# Patient Record
Sex: FEMALE | Race: ASIAN | NOT HISPANIC OR LATINO | URBAN - METROPOLITAN AREA
[De-identification: names, ages, dates, MRNs, and addresses within clinical notes are randomized per-mention and may not be internally consistent; named-entity substitution may affect disease eponyms.]

---

## 2017-12-21 ENCOUNTER — EMERGENCY (EMERGENCY)
Facility: HOSPITAL | Age: 22
LOS: 1 days | Discharge: HOME | End: 2017-12-21
Attending: EMERGENCY MEDICINE | Admitting: EMERGENCY MEDICINE
Payer: MEDICAID

## 2017-12-21 VITALS
SYSTOLIC BLOOD PRESSURE: 133 MMHG | TEMPERATURE: 98 F | RESPIRATION RATE: 22 BRPM | OXYGEN SATURATION: 100 % | DIASTOLIC BLOOD PRESSURE: 97 MMHG | HEART RATE: 94 BPM

## 2017-12-21 PROCEDURE — 99284 EMERGENCY DEPT VISIT MOD MDM: CPT

## 2017-12-21 PROCEDURE — 71020: CPT | Mod: 26

## 2017-12-21 RX ORDER — IPRATROPIUM/ALBUTEROL SULFATE 18-103MCG
3 AEROSOL WITH ADAPTER (GRAM) INHALATION ONCE
Qty: 0 | Refills: 0 | Status: COMPLETED | OUTPATIENT
Start: 2017-12-21 | End: 2017-12-21

## 2017-12-21 RX ORDER — ALBUTEROL 90 UG/1
2 AEROSOL, METERED ORAL EVERY 6 HOURS
Qty: 0 | Refills: 0 | Status: DISCONTINUED | OUTPATIENT
Start: 2017-12-21 | End: 2017-12-25

## 2017-12-21 RX ADMIN — Medication 3 MILLILITER(S): at 14:21

## 2017-12-21 RX ADMIN — Medication 3 MILLILITER(S): at 14:20

## 2017-12-21 RX ADMIN — Medication 40 MILLIGRAM(S): at 14:20

## 2017-12-21 RX ADMIN — ALBUTEROL 2 PUFF(S): 90 AEROSOL, METERED ORAL at 14:56

## 2017-12-21 NOTE — ED ADULT TRIAGE NOTE - CHIEF COMPLAINT QUOTE
has had episodes of wheezing last several days has been using inhaler more frequently  had 1 episode of hemoptysis given mask in triage

## 2017-12-21 NOTE — ED PROVIDER NOTE - OBJECTIVE STATEMENT
22 yr old female with no sig PMH last year was diagnosed with RAD states induced by cold.  Over last 2-3 days has been audibly wheezing in setting of URI.  Used brothers inhaler with improvement each time.  Does not have her own inhaler.  Denies fever, shortness of breath.

## 2017-12-21 NOTE — ED ADULT NURSE NOTE - OBJECTIVE STATEMENT
22 year old female presents to the Er with asthma exacerbation pt states she has been using her rescue inhaler without relief   pt is alert and oriented speaking full sentences no respiratory distress noted

## 2020-07-23 ENCOUNTER — EMERGENCY (EMERGENCY)
Facility: HOSPITAL | Age: 25
LOS: 0 days | Discharge: HOME | End: 2020-07-23
Payer: COMMERCIAL

## 2020-07-23 VITALS
HEART RATE: 115 BPM | DIASTOLIC BLOOD PRESSURE: 76 MMHG | HEIGHT: 65 IN | OXYGEN SATURATION: 100 % | SYSTOLIC BLOOD PRESSURE: 152 MMHG | RESPIRATION RATE: 16 BRPM | TEMPERATURE: 99 F | WEIGHT: 145.06 LBS

## 2020-07-23 VITALS — HEART RATE: 95 BPM

## 2020-07-23 DIAGNOSIS — R58 HEMORRHAGE, NOT ELSEWHERE CLASSIFIED: ICD-10-CM

## 2020-07-23 DIAGNOSIS — M79.602 PAIN IN LEFT ARM: ICD-10-CM

## 2020-07-23 PROCEDURE — 99283 EMERGENCY DEPT VISIT LOW MDM: CPT

## 2020-07-23 RX ORDER — IBUPROFEN 200 MG
600 TABLET ORAL ONCE
Refills: 0 | Status: COMPLETED | OUTPATIENT
Start: 2020-07-23 | End: 2020-07-23

## 2020-07-23 RX ADMIN — Medication 600 MILLIGRAM(S): at 21:48

## 2020-07-23 NOTE — ED PROVIDER NOTE - MUSCULOSKELETAL, MLM
Spine appears normal, range of motion is not limited, no muscle or joint tenderness. radial and brachial pulses intact + mild TTP to left antecubtal region at site of ecchymosis. no bony tenderness. FROM of elbow. no firmness of vein at AC. radial pulses equal and intact bilaterally.

## 2020-07-23 NOTE — ED ADULT TRIAGE NOTE - CHIEF COMPLAINT QUOTE
Pt complains of pain and stiffness to left arm. States she had blood work done on 07/21 and has bruising to arm 5/10 achy

## 2020-07-23 NOTE — ED PROVIDER NOTE - NEUROLOGICAL, MLM
Alert and oriented, no focal deficits, no motor or sensory deficits of UE on exam. Strength of bilateral UE 5/5.

## 2020-07-23 NOTE — ED PROVIDER NOTE - NSFOLLOWUPINSTRUCTIONS_ED_ALL_ED_FT
Superficial Thrombophlebitis    WHAT YOU NEED TO KNOW:    What is superficial thrombophlebitis (STP)? STP is inflammation of a vein just under your skin (superficial vein). The inflammation causes a blood clot to form in your vein. STP most often happens in your leg but may also happen in your arm.    What increases my risk for STP?     A condition that affects your blood vessels, such as varicose veins      A long-term IV catheter      Recent surgery      Multiple IV injections or IV drug abuse      Obesity, pregnancy, or cancer      Limited activity caused by bed rest, a leg cast, or sitting for long periods      A blood disorder that makes your blood clot faster than normal, such as factor V Leiden mutation      In women, hormone replacement therapy or birth control pills    What are the signs and symptoms of STP? You may see a red line on your skin that covers the vein. You may also have swelling and pain near the vein. You may have a fever if infection has spread from your vein to others places in your body.    How is STP diagnosed? Your healthcare provider will examine you. You may need any of the following:     Blood tests may be done to check for infection and test how fast your blood clots.       Doppler ultrasound uses sound waves to check for blood clots or damage to your vein.     How is STP treated?     Medicines may be given to treat an infection and decrease swelling and pain. Medicine may also be given to prevent more blood clots.      Removal of an IV catheter may be needed if your IV is infected.      Surgery may be needed to remove the blood clot or part of your vein. Surgery may also be needed to remove a collection of infected fluid from your vein.    What can I do to manage STP?     Apply a warm compress to your arm or leg. This will help decrease swelling and pain. Wet a washcloth in warm water. Do not use hot water. Apply the warm compress for 10 minutes. Repeat this 4 times each day.       Wear pressure stockings as directed. Pressure stockings improve blood flow and help prevent clots in your legs. Wear the stockings during the day. Do not wear them when you sleep. Pressure Stockings            Elevate your leg or arm above the level of your heart as often as you can. This will help decrease swelling and pain. Prop your leg or arm on pillows or blankets to keep it elevated comfortably. Elevate Limb         What can I do to prevent STP?     Maintain a healthy weight. This will help decrease your risk for another blood clot. Ask your healthcare provider how much you should weigh. Ask him or her to help you create a weight loss plan if you are overweight.      Do not smoke. Nicotine and other chemicals in cigarettes and cigars can damage blood vessels and increase your risk for blood clots. Ask your healthcare provider for information if you currently smoke and need help to quit. E-cigarettes or smokeless tobacco still contain nicotine. Talk to your healthcare provider before you use these products.      Change your body position or move around often. Move and stretch in your seat several times each hour if you travel by car or work at a desk. In an airplane, get up and walk every hour. Move your legs by tightening and releasing your leg muscles while sitting. You can move your legs while sitting by raising and lowering your heels. Keep your toes on the floor while you do this. You can also raise and lower your toes while keeping your heels on the floor.DVT Prevention Heel Raise     DVT Prevention Toe Raise           Exercise regularly to help increase your blood flow. Walking is a good low-impact exercise. Talk to your healthcare provider about the best exercise plan for you. Walking for Exercise           Do not inject illegal drugs. Talk to your healthcare provider if you use IV drugs and need help to quit.    Call your local emergency number (911 in the [place]) if:     You feel lightheaded, short of breath, and have chest pain.      You cough up blood.    When should I call my doctor?     Your arm or leg feels warm, tender, and painful. It may look swollen and red.      You have questions or concerns about your condition or care.    CARE AGREEMENT:    You have the right to help plan your care. Learn about your health condition and how it may be treated. Discuss treatment options with your healthcare providers to decide what care you want to receive. You always have the right to refuse treatment.

## 2020-07-23 NOTE — ED PROVIDER NOTE - OBJECTIVE STATEMENT
26 yo female with no significant pmh presents to the ED c/o bruising and pain to left antecubital region after getting blood drawn on 7/21. Patient states they had difficulty getting blood and was fishing around and stuck multiple times. Associated with tingling down her arm. Denied any additional bruising. Patient states she got her blood drawn last week without any issues or excessive/easy bruising. No h/o easy bruising or bleeding/clotting disorder for patient or family. Denies fever, chills, headache, dizziness, visual changes, chest pain, sob, abd pain, N/V, UE/LE weakness.

## 2020-07-23 NOTE — ED PROVIDER NOTE - CLINICAL SUMMARY MEDICAL DECISION MAKING FREE TEXT BOX
24 yo female with no significant pmh presents to the ED c/o bruising and pain to left antecubital region after getting blood drawn on 7/21. Patient states they had difficulty getting blood and was fishing around and stuck multiple times. Associated with tingling down her arm. Denied any additional bruising. Patient states she got her blood drawn last week without any issues or excessive/easy bruising. No h/o easy bruising or bleeding/clotting disorder for patient or family. Denies fever, chills, headache, dizziness, visual changes, chest pain, sob, abd pain, N/V, UE/LE weakness. A/P 24 yo female with no significant pmh presents to the ED c/o bruising and pain to left antecubital region after getting blood drawn on 7/21. Patient states they had difficulty getting blood and was fishing around and stuck multiple times. Associated with tingling down her arm. Denied any additional bruising. Patient states she got her blood drawn last week without any issues or excessive/easy bruising. No h/o easy bruising or bleeding/clotting disorder for patient or family. Denies fever, chills, headache, dizziness, visual changes, chest pain, sob, abd pain, N/V, UE/LE weakness. A/P: + healing ecchymosis to left antecubital region. Motor and sensory exam intact. no vascular compromise. No additional areas of ecchymosis. Most  likely traumatic ecchymosis from multiple blood draw attempts. Low suspicion for thrombophlebitis or DVT however patient instructed to use warm compresses and take motrin for pain. Follow up with vascular if symptoms do not improve.

## 2020-07-23 NOTE — ED PROVIDER NOTE - CARE PROVIDER_API CALL
Butch Ariza  SURGERY  [address] [place] [address]  Phone: [phone]  Fax: [phone]  Follow Up Time:

## 2020-07-23 NOTE — ED PROVIDER NOTE - SKIN, MLM
Skin normal color for race, warm, dry and intact. + large healing ecchymosis to left antecubital region Skin normal color for race, warm, dry and intact. + large healing ecchymosis to left antecubital region. no surrounding erythema, increased warmth or abscess. cap refill < 2 sec.

## 2020-07-23 NOTE — ED ADULT NURSE NOTE - OBJECTIVE STATEMENT
Pt c/o left arm pain, ecchymosis, hematoma post blood work x3 days.  Pt confirms left arm paresthesia.  Pt denies chest pain/difficulty breathing/abd pain/dysuria.

## 2024-05-17 ENCOUNTER — EMERGENCY (EMERGENCY)
Facility: HOSPITAL | Age: 29
LOS: 0 days | Discharge: HOME | End: 2024-05-18
Attending: STUDENT IN AN ORGANIZED HEALTH CARE EDUCATION/TRAINING PROGRAM
Payer: COMMERCIAL

## 2024-05-17 VITALS
HEART RATE: 115 BPM | TEMPERATURE: 98 F | OXYGEN SATURATION: 100 % | DIASTOLIC BLOOD PRESSURE: 93 MMHG | HEIGHT: 65 IN | SYSTOLIC BLOOD PRESSURE: 137 MMHG | RESPIRATION RATE: 18 BRPM | WEIGHT: 169.98 LBS

## 2024-05-17 DIAGNOSIS — M54.2 CERVICALGIA: ICD-10-CM

## 2024-05-17 PROCEDURE — 99284 EMERGENCY DEPT VISIT MOD MDM: CPT

## 2024-05-17 RX ORDER — OXYCODONE AND ACETAMINOPHEN 5; 325 MG/1; MG/1
1 TABLET ORAL
Qty: 9 | Refills: 0
Start: 2024-05-17 | End: 2024-05-19

## 2024-05-17 RX ORDER — LIDOCAINE 4 G/100G
1 CREAM TOPICAL
Qty: 2 | Refills: 2
Start: 2024-05-17 | End: 2024-05-31

## 2024-05-17 NOTE — ED ADULT TRIAGE NOTE - CHIEF COMPLAINT QUOTE
pt c/o "pressure" in L-side of neck,  pressure increases on swallowing and talking,  started this evening after work.  pt states it has been a stressful week at work.  pt also states,   "I also feel pressure on L-side of head, like blood just rushing"  (PMH-asthma, pcos)  LMP - 5/12/24.  pt crying in triage, anxious.   visual inspection of uvula and throat are clear.

## 2024-05-18 VITALS
SYSTOLIC BLOOD PRESSURE: 121 MMHG | DIASTOLIC BLOOD PRESSURE: 87 MMHG | RESPIRATION RATE: 18 BRPM | OXYGEN SATURATION: 100 % | HEART RATE: 95 BPM | TEMPERATURE: 98 F

## 2024-05-18 RX ORDER — ACETAMINOPHEN 500 MG
650 TABLET ORAL ONCE
Refills: 0 | Status: COMPLETED | OUTPATIENT
Start: 2024-05-18 | End: 2024-05-18

## 2024-05-18 RX ADMIN — Medication 650 MILLIGRAM(S): at 00:12

## 2024-05-18 NOTE — ED PROVIDER NOTE - PATIENT PORTAL LINK FT
You can access the FollowMyHealth Patient Portal offered by [facility] by registering at the following website: [email or web address]. By joining [facility]’s FollowMyHealth portal, you will also be able to view your health information using other applications (apps) compatible with our system.

## 2024-05-18 NOTE — ED PROVIDER NOTE - NSFOLLOWUPINSTRUCTIONS_ED_ALL_ED_FT
You were seen in the ED for neck pain. Return to the ED if you experience new or worsening symptoms such as trouble breathing, nausea, vomiting, neck swelling.

## 2024-05-18 NOTE — ED PROVIDER NOTE - PHYSICAL EXAMINATION
General: Well appearing female in no acute distress  HEENT: Normocephalic, atraumatic. Moist mucous membranes. Oropharynx clear. No lymphadenopathy.  Eyes: No scleral icterus. EOMI. QUYNH.  Neck:. Soft and supple. Full ROM without pain. No midline tenderness. L upper neck tenderness  Cardiac: Regular rate and regular rhythm. No murmurs, rubs, gallops. Peripheral pulses 2+ and symmetric. No LE edema.  Resp: Lungs CTAB. Speaking in full sentences. No wheezes, rales or rhonchi.  Abd: Soft, non-tender, non-distended. No guarding or rebound. No scars, masses, or lesions.  Back: Spine midline and non-tender. No CVA tenderness.    Skin: No rashes, abrasions, or lacerations.  Neuro: AO x 3. Moves all extremities symmetrically. Motor strength and sensation grossly intact.

## 2024-05-18 NOTE — ED PROVIDER NOTE - INTERNATIONAL TRAVEL
Urinalysis/EKG/CBC/POCT Blood Glucose/BMP/Hepatic Function/CMP/Type and Screen/COVID/Results in MD note/CXR No CXR/CBC/Urinalysis/EKG/POCT Blood Glucose/BMP/Hepatic Function//CMP/Type and Screen/Results in MD note/COVID
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
## 2024-05-18 NOTE — ED PROVIDER NOTE - DISPOSITION TYPE
"Chief Complaint   Patient presents with     Consult     'Family hx of SVT' 'Wearing event monitor and high heart rates recorded'     /73 (BP Location: Right arm, Patient Position: Supine)  Pulse 103  Resp 24  Ht 4' 9.4\" (145.8 cm)  Wt 156 lb 4.9 oz (70.9 kg)  SpO2 100%  BMI 33.35 kg/m2    Melani Zee LPN    "
DISCHARGE
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
## 2024-05-18 NOTE — ED PROVIDER NOTE - NS ED ROS FT
General: Denies fever, chills  HEENT: Denies sensory changes, sore throat  Neck: + L neck pain; (-) neck stiffness  Resp: Denies coughing, SOB  Cardiovascular: Denies CP, palpitations, LE edema  GI: Denies nausea, vomiting, abdominal pain, diarrhea, constipation, blood in stool  : Denies dysuria, hematuria, frequency, incontinence  MSK: Denies back pain  Neuro: Denies HA, dizziness, numbness, weakness  Skin: Denies rashes.